# Patient Record
Sex: MALE | Race: WHITE | NOT HISPANIC OR LATINO | Employment: UNEMPLOYED | URBAN - METROPOLITAN AREA
[De-identification: names, ages, dates, MRNs, and addresses within clinical notes are randomized per-mention and may not be internally consistent; named-entity substitution may affect disease eponyms.]

---

## 2017-06-19 ENCOUNTER — OFFICE VISIT - HEALTHEAST (OUTPATIENT)
Dept: INTERNAL MEDICINE | Facility: CLINIC | Age: 47
End: 2017-06-19

## 2017-06-19 DIAGNOSIS — M13.0 POLYARTHROPATHY OR POLYARTHRITIS, HAND: ICD-10-CM

## 2017-06-19 DIAGNOSIS — H10.10 ALLERGIC CONJUNCTIVITIS: ICD-10-CM

## 2017-10-10 ENCOUNTER — OFFICE VISIT - HEALTHEAST (OUTPATIENT)
Dept: RHEUMATOLOGY | Facility: CLINIC | Age: 47
End: 2017-10-10

## 2017-10-10 DIAGNOSIS — M25.50 ARTHRALGIA: ICD-10-CM

## 2017-10-10 DIAGNOSIS — M13.0 POLYARTHROPATHY OR POLYARTHRITIS, HAND: ICD-10-CM

## 2017-10-10 ASSESSMENT — MIFFLIN-ST. JEOR: SCORE: 1734.78

## 2017-10-13 ENCOUNTER — OFFICE VISIT - HEALTHEAST (OUTPATIENT)
Dept: ALLERGY | Facility: CLINIC | Age: 47
End: 2017-10-13

## 2017-10-13 DIAGNOSIS — T78.1XXA ADVERSE FOOD REACTION, INITIAL ENCOUNTER: ICD-10-CM

## 2017-10-13 DIAGNOSIS — J30.9 ALLERGIC RHINITIS: ICD-10-CM

## 2017-10-13 DIAGNOSIS — M13.0 POLYARTHROPATHY OR POLYARTHRITIS, HAND: ICD-10-CM

## 2017-10-13 DIAGNOSIS — R10.9 ABDOMINAL PAIN: ICD-10-CM

## 2017-10-13 ASSESSMENT — MIFFLIN-ST. JEOR: SCORE: 1730.24

## 2017-10-16 ENCOUNTER — COMMUNICATION - HEALTHEAST (OUTPATIENT)
Dept: ALLERGY | Facility: CLINIC | Age: 47
End: 2017-10-16

## 2017-10-16 LAB
GLIADIN IGA SER-ACNC: 1.2 U/ML
GLIADIN IGG SER-ACNC: 0.6 U/ML
IGA SERPL-MCNC: 261 MG/DL (ref 65–400)
TTG IGA SER-ACNC: 0.4 U/ML
TTG IGG SER-ACNC: <0.6 U/ML

## 2017-12-11 ENCOUNTER — OFFICE VISIT - HEALTHEAST (OUTPATIENT)
Dept: INTERNAL MEDICINE | Facility: CLINIC | Age: 47
End: 2017-12-11

## 2017-12-11 DIAGNOSIS — Z00.00 ROUTINE GENERAL MEDICAL EXAMINATION AT A HEALTH CARE FACILITY: ICD-10-CM

## 2017-12-11 RX ORDER — AZITHROMYCIN 250 MG/1
TABLET, FILM COATED ORAL
Qty: 6 TABLET | Refills: 0 | Status: SHIPPED | OUTPATIENT
Start: 2017-12-11

## 2017-12-11 RX ORDER — METRONIDAZOLE 7.5 MG/G
GEL TOPICAL
Qty: 45 G | Refills: 3 | Status: SHIPPED | OUTPATIENT
Start: 2017-12-11

## 2017-12-11 RX ORDER — ALBUTEROL SULFATE 90 UG/1
1 AEROSOL, METERED RESPIRATORY (INHALATION) EVERY 4 HOURS PRN
Qty: 2 INHALER | Refills: 0 | Status: SHIPPED | OUTPATIENT
Start: 2017-12-11

## 2017-12-11 ASSESSMENT — MIFFLIN-ST. JEOR: SCORE: 1726.28

## 2018-02-23 ENCOUNTER — COMMUNICATION - HEALTHEAST (OUTPATIENT)
Dept: INTERNAL MEDICINE | Facility: CLINIC | Age: 48
End: 2018-02-23

## 2018-03-05 ENCOUNTER — COMMUNICATION - HEALTHEAST (OUTPATIENT)
Dept: INTERNAL MEDICINE | Facility: CLINIC | Age: 48
End: 2018-03-05

## 2018-04-02 ENCOUNTER — COMMUNICATION - HEALTHEAST (OUTPATIENT)
Dept: INTERNAL MEDICINE | Facility: CLINIC | Age: 48
End: 2018-04-02

## 2018-11-06 ENCOUNTER — COMMUNICATION - HEALTHEAST (OUTPATIENT)
Dept: INTERNAL MEDICINE | Facility: CLINIC | Age: 48
End: 2018-11-06

## 2020-02-14 ENCOUNTER — COMMUNICATION - HEALTHEAST (OUTPATIENT)
Dept: SCHEDULING | Facility: CLINIC | Age: 50
End: 2020-02-14

## 2020-09-02 ENCOUNTER — OFFICE VISIT (OUTPATIENT)
Dept: FAMILY MEDICINE | Facility: CLINIC | Age: 50
End: 2020-09-02
Payer: COMMERCIAL

## 2020-09-02 VITALS
BODY MASS INDEX: 26.07 KG/M2 | OXYGEN SATURATION: 99 % | RESPIRATION RATE: 15 BRPM | HEIGHT: 71 IN | SYSTOLIC BLOOD PRESSURE: 144 MMHG | WEIGHT: 186.25 LBS | TEMPERATURE: 97.3 F | HEART RATE: 71 BPM | DIASTOLIC BLOOD PRESSURE: 84 MMHG

## 2020-09-02 DIAGNOSIS — R94.6 THYROID FUNCTION TEST ABNORMAL: ICD-10-CM

## 2020-09-02 DIAGNOSIS — R03.0 ELEVATED BLOOD PRESSURE READING WITHOUT DIAGNOSIS OF HYPERTENSION: ICD-10-CM

## 2020-09-02 DIAGNOSIS — D22.9 MULTIPLE ATYPICAL SKIN MOLES: ICD-10-CM

## 2020-09-02 DIAGNOSIS — Z13.220 SCREENING FOR LIPID DISORDERS: ICD-10-CM

## 2020-09-02 DIAGNOSIS — Z00.00 HEALTHCARE MAINTENANCE: Primary | ICD-10-CM

## 2020-09-02 DIAGNOSIS — Z80.0 FAMILY HISTORY OF COLON CANCER: ICD-10-CM

## 2020-09-02 LAB
ALBUMIN SERPL-MCNC: 4.1 G/DL (ref 3.3–4.6)
ALP SERPL-CCNC: 77 U/L (ref 40–150)
ALT SERPL-CCNC: 35 U/L (ref 0–70)
AST SERPL-CCNC: 40 U/L (ref 0–55)
BILIRUB SERPL-MCNC: 0.8 MG/DL (ref 0.2–1.3)
BUN SERPL-MCNC: 14 MG/DL (ref 5–24)
CALCIUM SERPL-MCNC: 9.7 MG/DL (ref 8.5–10.4)
CHLORIDE SERPLBLD-SCNC: 103 MMOL/L (ref 94–109)
CHOLEST SERPL-MCNC: 168 MG/DL (ref 0–200)
CHOLEST/HDLC SERPL: 2.9 {RATIO} (ref 0–5)
CO2 SERPL-SCNC: 25 MMOL/L (ref 20–32)
CREAT SERPL-MCNC: 1 MG/DL (ref 0.8–1.5)
EGFR CALCULATED (BLACK REFERENCE): 102.6
EGFR CALCULATED (NON BLACK REFERENCE): 84.8
FASTING SPECIMEN: YES
GLUCOSE SERPL-MCNC: 85 MG/DL (ref 60–99)
HDLC SERPL-MCNC: 57 MG/DL
LDLC SERPL CALC-MCNC: 94 MG/DL (ref 0–129)
POTASSIUM SERPL-SCNC: 3.9 MMOL/L (ref 3.4–5.3)
PROT SERPL-MCNC: 6.9 G/DL (ref 6.8–8.8)
SODIUM SERPL-SCNC: 141 MMOL/L (ref 137.3–146.3)
TRIGL SERPL-MCNC: 85 MG/DL (ref 0–150)
TSH SERPL DL<=0.005 MIU/L-ACNC: 3.8 MU/L (ref 0.4–4)
VLDL-CHOLESTEROL: 17 (ref 7–32)

## 2020-09-02 SDOH — HEALTH STABILITY: MENTAL HEALTH: HOW OFTEN DO YOU HAVE A DRINK CONTAINING ALCOHOL?: 4 OR MORE TIMES A WEEK

## 2020-09-02 SDOH — HEALTH STABILITY: MENTAL HEALTH: HOW OFTEN DO YOU HAVE 6 OR MORE DRINKS ON ONE OCCASION?: NEVER

## 2020-09-02 SDOH — HEALTH STABILITY: MENTAL HEALTH: HOW MANY STANDARD DRINKS CONTAINING ALCOHOL DO YOU HAVE ON A TYPICAL DAY?: 1 OR 2

## 2020-09-02 ASSESSMENT — MIFFLIN-ST. JEOR: SCORE: 1731.07

## 2020-09-02 NOTE — PATIENT INSTRUCTIONS
"ASSESSMENT/PLAN:    Jerome is a generally healthy 51 yo who presents for his first visit to our clinic.   Some issues as follows:    1. Healthcare maintenance  Recommended small amount of weight loss given the borderline elevated blood pressure. States that he feels confident that he can get to around 175 lbs. Suggest more protein and greens in the morning. Also, may want to limit number of hours eating.     2. Elevated blood pressure reading without diagnosis of hypertension  -Look into the \"DASH\" diet.   -Minimize salt.  -Take Magnesium 325 mg at bedtime  - If pressures stay elevated return to clinic for follow up.   - Comprehensive Metabolic Panel ([place])    3. Screening for lipid disorders    - Lipid Panel ([place])    4. Multiple atypical skin moles, none look too suspcious, but will refer to dermatology  - DERMATOLOGY ADULT REFERRAL; Future    5. Family history of colon cancer    - GASTROENTEROLOGY ADULT REF PROCEDURE ONLY; Future    6. Thyroid function test abnormal    - TSH with free T4 reflex     7. Misc:  Suggested running shoe alterations given corns on side of RIGHT foot.   Also, hamstring strengthening with physioball.      --Shane Toledo MD  [facility]  Department of Family Medicine and Community Health   "

## 2020-09-02 NOTE — LETTER
September 3, 2020      Jerome Mcgraw  [address]  [address]  [facility] [address]        Jerome,    Here are your labs.   Normal/Healthy cholesterol levels and Comprehensive panel.     The thyroid test also returned in the normal range. It is on the higher side of normal so may want to recheck it in another year or more.     --Shane Toledo MD     Resulted Orders   Lipid Panel ([place])   Result Value Ref Range    FASTING SPECIMEN yes     Cholesterol 168.0 0.0 - 200.0    HDL Cholesterol 57.0 >40.0    Triglycerides 85.0 0.0 - 150.0    Cholesterol/HDL Ratio 2.9 0.0 - 5.0    LDL Cholesterol Direct 94.0 0.0 - 129.0    VLDL-Cholesterol 17.0 7.0 - 32.0   TSH with free T4 reflex   Result Value Ref Range    TSH 3.80 0.40 - 4.00 mU/L   Comprehensive Metabolic Panel ([place])   Result Value Ref Range    Glucose 85.0 60.0 - 99.0 mg/dL    Urea Nitrogen 14.0 5.0 - 24.0 mg/dL    Calcium 9.7 8.5 - 10.4 mg/dL    Creatinine 1.0 0.8 - 1.5 mg/dL    eGFR Calculated (Non Black Reference) 84.8 >60.0    eGFR Calculated (Black Reference) 102.6 >60.0    Sodium 141.0 137.3 - 146.3 mmol/L    Potassium 3.9 3.4 - 5.3 mmol/L    Chloride 103.0 94.0 - 109.0 mmol/L    Carbon Dioxide 25.0 20.0 - 32.0 mmol/L    Albumin 4.1 3.3 - 4.6 g/dL    Alkaline Phosphatase 77.0 40.0 - 150.0 U/L    ALT 35.0 0.0 - 70.0 U/L    AST 40.0 0.0 - 55.0 U/L    Bilirubin Total 0.8 0.2 - 1.3 mg/dL    Protein Total 6.9 6.8 - 8.8 g/dL

## 2020-09-02 NOTE — NURSING NOTE
"50 year old  Chief Complaint   Patient presents with     Physical     with labs, family history of cancer, talk about testing     Hypertension     blood pressure has been higher recently     Derm Problem     moles wants checked, also possible athlete's foot on right foot       Blood pressure (!) 152/99, pulse 72, temperature 97.3  F (36.3  C), temperature source Skin, resp. rate 15, height 1.794 m (5' 10.63\"), weight 84.5 kg (186 lb 4 oz), SpO2 99 %. Body mass index is 26.25 kg/m .  There is no problem list on file for this patient.      Wt Readings from Last 2 Encounters:   09/02/20 84.5 kg (186 lb 4 oz)     BP Readings from Last 3 Encounters:   09/02/20 (!) 152/99         No current outpatient medications on file.     No current facility-administered medications for this visit.        Social History     Tobacco Use     Smoking status: Never Smoker     Smokeless tobacco: Never Used   Substance Use Topics     Alcohol use: Yes     Frequency: 4 or more times a week     Drinks per session: 1 or 2     Binge frequency: Never     Drug use: Never       Health Maintenance Due   Topic Date Due     PREVENTIVE CARE VISIT  1972     HIV SCREENING  03/17/1987     DTAP/TDAP/TD IMMUNIZATION (1 - Tdap) 03/17/1997     LIPID  03/17/2007     PHQ-2  01/01/2020     INFLUENZA VACCINE (1) 09/01/2020       No results found for: PAP      September 2, 2020 1:44 PM    "

## 2020-09-02 NOTE — PROGRESS NOTES
Jerome Mcgraw is a 50 year old male who presents today for his first visit to the [facility].     Recently moved back to [facility] from [facility]. Family was there as wife works for [employer].     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      with a son, james Zhao 2008.     Wife works for [employer].      Main issues today are to talk about cancer screening.   Also, has been monitoring blood pressure due to mild elevations when in [facility] and it's been high lately. Also, [facility] was 140/85 in July 2020. Has a home BP unit. Home readings of 117-135/75-85  Had a negative stress test in [facility].  His Thyroid was borderline in [facility].     Also, wants a mole checked and to evaluate possible Athlete's foot.     Normally weighs between 180-185 lbs.     Regarding lifestyle behaviors:  Physical activity - Active runner and cyclist. Has run a few marathons.     Diet - Healthy    He drinks alcohol approximately 4 nights/week and 1-2 drinks/night.     He does not use tobacco products.     Jerome Mcgraw wears seat belt, and when biking, a bike helmet.    His last dental check up was a few weeks ago.       STD concerns: none    ED or BPH: no symptoms    There is no problem list on file for this patient.      No past surgical history on file.    Family History   Problem Relation Age of Onset     Cancer Mother         bile duct      Breast Cancer Maternal Grandmother      Lung Cancer Maternal Grandfather      Colon Cancer Paternal Grandmother      Colon Cancer Maternal Cousin    * Strong family history of cancer.       No current outpatient medications on file.     Allergies   Allergen Reactions     Seasonal Allergies          Regarding preventive health care, his immunizations are as follows:    Immunization History   Administered Date(s) Administered     FLU 6-35 months 11/01/2011, 11/19/2012     Influenza (IIV3) PF 11/10/2016, 12/11/2017     Pneumococcal  "23 valent 11/19/2012     Td (Adult), Adsorbed 03/01/2005     Tdap (Adult) Unspecified Formulation 08/01/2011       Screening for colorectal cancer - None yet, but strong family history of cancers.         ROS  PHQ-2 Score:     PHQ-2 ( 1999 Pfizer) 9/2/2020   Q1: Little interest or pleasure in doing things 0   Q2: Feeling down, depressed or hopeless 0   PHQ-2 Score 0       CONSTITUTIONAL:NEGATIVE for fever, chills, change in weight  EYES: NEGATIVE for vision changes or irritation  ENT/MOUTH: NEGATIVE for ear, mouth and throat problems  RESP:NEGATIVE for significant cough or SOB  CV: NEGATIVE for chest pain, palpitations, ESTEVEZ, orthopnea, PND  or peripheral edema  GI: NEGATIVE for nausea, abdominal pain, heartburn, or change in bowel habits  :NEGATIVE for frequency, dysuria, or hematuria  MUSCULOSKELETAL:NEGATIVE for significant arthralgias or myalgia  NEURO: NEGATIVE for weakness, dizziness or paresthesias  ENDOCRINE: NEGATIVE for polyuria/dipsia,  temperature intolerance, skin/hair changes  HEME/ALLERGY/IMMUNE: NEGATIVE for bleeding problems  PSYCHIATRIC: NEGATIVE for changes in mood or affect    EXAM  BP (!) 152/99   Pulse 72   Temp 97.3  F (36.3  C) (Skin)   Resp 15   Ht 1.794 m (5' 10.63\")   Wt 84.5 kg (186 lb 4 oz)   SpO2 99%   BMI 26.25 kg/m      Appears as a healthy, physically fit 50-year-old in no distress.  H EENT was normal.  Neck was supple without lymphadenopathy.  Cardiovascular was regular rate and rhythm without murmurs    Lungs were clear to auscultation  Abdomen was soft and nontender without hepatosplenomegaly    Musculoskeletal exam was overall normal although he has limited hip flexion on the right side greater than the left side with also limited internal range of motion of his hips.    Normal knee exam.    Right foot has some calluses forming over the fibular aspect of the fifth metatarsal phalangeal joint and some of the surrounding areas.    Skin with multiple moles across his chest " "and back.  There is one small scaly mole on his left anterior chest wall that he is concerned about.  It is small and scaly.    His mentation and affect were normal.      ASSESSMENT/PLAN:    Jerome is a generally healthy 49 yo who presents for his first visit to our clinic.   Some issues as follows:    1. Healthcare maintenance  Recommended small amount of weight loss given the borderline elevated blood pressure. States that he feels confident that he can get to around 175 lbs. Suggest more protein and greens in the morning. Also, may want to limit number of hours eating.     2. Elevated blood pressure reading without diagnosis of hypertension  -Look into the \"DASH\" diet.   -Minimize salt.  -Take Magnesium 325 mg at bedtime  - If pressures stay elevated return to clinic for follow up.   - Comprehensive Metabolic Panel ([place])    3. Screening for lipid disorders    - Lipid Panel ([place])    4. Multiple atypical skin moles, none look too suspcious, but will refer to dermatology  - DERMATOLOGY ADULT REFERRAL; Future    5. Family history of colon cancer    - GASTROENTEROLOGY ADULT REF PROCEDURE ONLY; Future    6. Thyroid function test abnormal    - TSH with free T4 reflex     7. Misc:  Suggested running shoe alterations given corns on side of RIGHT foot.   Also, hamstring strengthening with physioball.      --Shane Toledo MD  [facility]  Department of Family Medicine and Community Health           "

## 2021-01-04 ENCOUNTER — HEALTH MAINTENANCE LETTER (OUTPATIENT)
Age: 51
End: 2021-01-04

## 2021-05-31 VITALS — HEIGHT: 71 IN | WEIGHT: 185 LBS | BODY MASS INDEX: 25.9 KG/M2

## 2021-05-31 VITALS — HEIGHT: 71 IN | BODY MASS INDEX: 26.04 KG/M2 | WEIGHT: 186 LBS

## 2021-06-06 NOTE — TELEPHONE ENCOUNTER
Patient calling. Has no symptoms of the flu, just wants to know if he should have TAMIFLU Medication.  He states his son has the flu, unsure if it is INFLUENZA .    Patient has no underlying illnesses.or chronic conditions.  Told to call back if it is confirmed today that he was exposed to INFLUENZA A OR B .    Patient agrees to POC.    Malia Palm RN  [facility] Triage/refill nurse

## 2021-06-11 NOTE — PROGRESS NOTES
Clinic Note    Assessment:     1. Polyarthritis Of The Hand  Ambulatory referral to Rheumatology   2. Allergic conjunctivitis  Ambulatory referral to Allergy     Assessment and Plan:  He is here about the conjunctivitis which seems allergic in nature and for which I prescribed azelastine drops if he wants to switch from his Zyrtec drops over-the-counter. Nothing concerning for conjunctivitis that needs abx tx.    For his persistent arthropathy in his right index finger in the PIP, I would suggest that he see Dr. Benjamin again for repeat evaluation of this inflammatory arthropathy.  His comment is that his left DIP is starting to be a bit sore.  It did not look like an inflammatory process at this point to me.     Patient Instructions   Azelastine one drop each eye twice per day    Allergist    Rheumatologist    Return if symptoms worsen or fail to improve.         Subjective:      Jerome Mcgraw is a 47 y.o. male comes in complaining of few days of itchy watery eyes.  He has significant allergies and allergic rhinitis and has had some allergic eye symptoms before.  Just started using Zyrtec eyedrop last night.  There was no significant pus.  He wanted to make sure there was no infection.  He would like to see an allergist about his allergies.  He had questions or concerns about whether or not his allergies are causing inflammation in the joints and were tied to the arthropathy.  I do not believe so.  He is gotten multiple injections gets them every 3-6 months through a orthopedist who Dr. GRANADO had suggested the patient seen for another opinion.  He has not been back to see Dr. GRANADO for some time.    The following portions of the patient's history were reviewed and updated as appropriate: Past medical history, allergies, medications,    Review of Systems:    Completely negative except as above  History   Smoking Status     Former Smoker   Smokeless Tobacco     Not on file         Objective:     Vitals:    06/19/17 1012    BP: 112/64   Pulse: 62       Exam:  Both eyes conjunctive in the sclera conjunctive are somewhat erythematous but the left worse than right.  There is no pus.  There is no foreign body sensation.    Right PIP is swollen left DIP does not appear swollen  Psych-normal  ENT sounds a little congested      Patient Active Problem List   Diagnosis     Asthma     Polyarthritis Of The Hand     Arthralgia     Current Outpatient Prescriptions on File Prior to Visit   Medication Sig Dispense Refill     [DISCONTINUED] albuterol (PROAIR HFA) 90 mcg/actuation inhaler Inhale 1 puff 2 (two) times a day. 1 Inhaler 3     No current facility-administered medications on file prior to visit.          Dima Patino    6/19/2017

## 2021-06-13 NOTE — PROGRESS NOTES
ASSESSMENT AND PLAN:  Jerome Mcgraw 47 y.o. male   here for follow-up of inflammatory arthropathy/Tinosynovitis which is troubled him for years and his right index finger.  There is been little evidence to suggest I defining correct her such as associated psoriasis.  He has been going to the hand surgeon for periodic corticosteroid injections.  He is also noted a new phenomenon such as the left index finger DIP and the fifth finger DIP areas becoming more prominent and painful these are likely Heberden nodes.  I have given him literature on psoriatic arthritis, osteoarthritis review management principles were reviewed.      Diagnoses and all orders for this visit:    Polyarthritis Of The Hand    Arthralgia        HISTORY OF PRESENTING ILLNESS:  Jerome Mcgraw 47 y.o. is here for  is here for follow-up after an extended hiatus last seen here in January 2016.  He has noticed new issues, he is noted left index finger and fifth digits of the left side DIP area tenderness and sometimes difficulty making it flexed.  This is something that is different from what his experience with the past several years which is the right index finger swelling tenderness which is features of tenosynovitis.  He has had periodic injections every 3-5 months at orthopedics for this.  These provide him with that relief.  Now he wonders about synovitic to me as was discussed in the past.  He does not have psoriasis himself or the family.  He has not had iritis.  He has not involvement of other similar joints in digits or toes.  , We reviewed the data so far. He noted that there is no limitation of his day-to-day activity. Further historical information, including ROS and limitation in activities as noted in the multidimensional health assessment questionnaire scanned in the EMR and in the assessment and plan section.  The following is a note from the infectious disease home he saw on 10/9/15.  Assessment:   Intermittent right index finger  "PIP swelling and tenderness and redness in a patient with exposure to tick bites, positive lyme serology from 2012 and 2013. Never treated as lyme disease. Much better ROM with 28 days of Doxy. Unclear if this is because of steroid injection or the antibiotic. I suspect because of the antibiotic. Based on previous frequency of recurrence should be have more flare soon if diagnosis of lyme arthritis is incorrect.   Recommendations:   -Let's complete the few days of antibiotic left.   -If recurrence of flare, please give me a call and we will repeat the course of Doxy 1 more time.   -If recurrence let's not do the injection in order to be able to assess response to antibiotic alone.   -If no recurrence then we most likely have lyme arthritis as diagnosis.   Follow up as needed.   HUYEN chand tot. [facility]   [facility]   [phone] Option-2    ALLERGIES:Review of patient's allergies indicates no known allergies.    PAST MEDICAL/ACTIVE PROBLEMS/MEDICATION/SOCIAL DATA  No past medical history on file.  History   Smoking Status     Former Smoker   Smokeless Tobacco     Not on file     Patient Active Problem List   Diagnosis     Asthma     Polyarthritis Of The Hand     Arthralgia     Current Outpatient Prescriptions   Medication Sig Dispense Refill     oxymetazoline (RHOFADE) 1 % Crea Apply topically daily.       No current facility-administered medications for this visit.        DETAILED EXAMINATION  10/10/17  :  Vitals:    10/10/17 0823   BP: 138/80   Pulse: (!) 55   Weight: 186 lb (84.4 kg)   Height: 5' 11.25\" (1.81 m)     Alert oriented. Head including the face is examined for malar rash, heliotropes, scarring, lupus pernio. Eyes examined for redness such as in episcleritis/scleritis, periorbital lesions.   Neck examined  for lymph nodes, range of motion Both upper and lower extremities (all four) examined for swollen, warm &/or  tender joints, range of " motion, rash, muscle weakness, edema. The salient normal / abnormal findings are appended.  Heberden nodes left index and left fifth digit.  Modest swelling of the right index finger proximal half.  This is minimally tender.  There is no onycholysis, dactylitis, nail pitting. There is no synovitis in the MCPs, wrists, elbows, and knees.      LAB / IMAGING DATA:  ALT   Date Value Ref Range Status   11/10/2016 26 0 - 45 U/L Final   08/12/2015 22 0 - 45 U/L Final   02/12/2014 35 12 - 78 U/L Final     Comment:     New ALT test method with new reference range as of 6/4/12     Albumin   Date Value Ref Range Status   11/10/2016 4.0 3.5 - 5.0 g/dL Final   08/12/2015 3.8 3.5 - 5.0 g/dL Final   02/12/2014 4.1 3.4 - 5.0 g/dL Final     Creatinine   Date Value Ref Range Status   11/10/2016 0.74 0.70 - 1.30 mg/dL Final   08/12/2015 0.80 0.70 - 1.30 mg/dL Final   06/06/2013 0.9 0.6 - 1.3 mg/dL Final       WBC   Date Value Ref Range Status   11/10/2016 5.2 4.0 - 11.0 thou/uL Final   08/12/2015 5.3 4.0 - 11.0 thou/uL Final     Hemoglobin   Date Value Ref Range Status   11/10/2016 14.7 14.0 - 18.0 g/dL Final   08/12/2015 14.7 14.0 - 18.0 g/dL Final   05/16/2013 13.8 (L) 14.0 - 18.0 g/dL Final     Platelets   Date Value Ref Range Status   11/10/2016 247 140 - 440 thou/uL Final   08/12/2015 229 140 - 440 thou/uL Final   05/16/2013 195 140 - 440 thou/uL Final       Lab Results   Component Value Date    RF <15.0 08/12/2015    SEDRATE 2 08/12/2015

## 2021-06-13 NOTE — PROGRESS NOTES
Chief complaint: Food allergy    History of present illness: This is a pleasant 47-year-old gentleman who is here today for evaluation of food allergy.  He states that he has developed polyarthritis of the hands.  He states specifically one finger has been giving him trouble.  He has been seen by rheumatology and is receiving cortisone injections into his finger.  He states he is now getting arthritis in some of his other digits.  He wonders if he could have inflammation secondary to food.  He is not suspicious of one's particular food.  He had a friend who underwent what sounds like E 95 her IgG food testing.  He is wondering if this may be an option for him.  He does complain of some abdominal pain and notes that his dad has colitis so he is concerned about celiac disease.  He states he does have some abnormal bowel habits at times.  He denies any hives, swelling or shortness of breath when he eats foods.    He does have a history of environmental allergies and states that he underwent environmental allergy testing years ago and was positive to multiple pollens and molds.  He states he has symptoms year round so he wonders if there is other things contributing to his symptoms.  He does not use any allergy medication regularly.  He has not tried nasal rinses regularly.  He has not use nasal sprays.  No history of asthma.  He states he has a lot of nasal congestion.  He has drainage.  He denies any itchy watery eyes.  Sense of smell is intact.    Past medical history: Exercise-induced bronchospasm, nasal septum repair    Social history: He lives in a home with central air and a basement, no visible mold, non-smoker    Family history: Dad with colitis    Review of Systems performed as above and the remainder is negative.  He states that he has a rash on both arms that he has been told is secondary to broken blood vessels      Current Outpatient Prescriptions:      oxymetazoline (RHOFADE) 1 % Crea, Apply topically  "daily., Disp: , Rfl:     No Known Allergies    /82  Pulse 72  Ht 5' 11.25\" (1.81 m)  Wt 185 lb (83.9 kg)  BMI 25.62 kg/m2  Gen: Pleasant male not in acute distress  HEENT: Eyes no erythema of the bulbar or palpebral conjunctiva, no edema. Ears: TMs well visualized, no effusions. Nose: Inferior turbinates are congested.  Clear mucus discharge in both nasal passages mouth: Throat clear drainage, no lip or tongue edema.     Skin: Small petechiae of both arms bilaterally  Psych: Alert and oriented times 3    Impression report and plan:    1.  Adverse food reaction  2.  Arthritis  3.  Abdominal pain  4.  Allergic rhinitis    Symptoms are not consistent with IgE mediated food allergy.  For this reason he is not a candidate for food allergy testing.  I did state that he 89 testing her IgG testing is not evidence based.  For this reason it is difficult to interpret.  There is no evidence based medicine tests for inflammation secondary to food or food sensitivities or intolerances except for gluten.  We can do a celiac disease test today given that he does have some abdominal pain at times and is concerned about this.  I gave him information regarding the FODMAP diet but stated this is often more helpful for focus to have predominantly gastrointestinal symptoms.  For his allergic rhinitis, I recommended the use of nasal rinses regularly, I went over environmental control.  I suggested a daily antihistamine plus fluticasone nasal spray.  Follow as needed.    Time spent with the patient, 30 minutes, greater than half spent counseling and coordination of care regarding allergy.          "

## 2021-06-14 NOTE — PROGRESS NOTES
ASSESSMENT:  1. Routine general medical examination at a health care facility  All up-to-date.    PLAN:  Patient Instructions   If you develop a fever or your ears begin to hurt/your symptoms worsen, fill the Zithromax.      Orders Placed This Encounter   Procedures     Influenza, Seasonal,Quad Inj, 36+ MOS     Medications Discontinued During This Encounter   Medication Reason     oxymetazoline (RHOFADE) 1 % Crea Therapy completed     albuterol (PROAIR HFA) 90 mcg/actuation inhaler Therapy completed     metroNIDAZOLE (METROGEL) 0.75 % gel Reorder       Return in about 1 year (around 12/11/2018) for Annual physical.    ASSESSED PROBLEMS:  Problem List Items Addressed This Visit     None      Visit Diagnoses     Routine general medical examination at a health care facility    -  Primary          CHIEF COMPLAINT:  Chief Complaint   Patient presents with     physical exam       HISTORY OF PRESENT ILLNESS:  Jerome Mcgraw is a 47 y.o. male presenting to the clinic today for an annual physical exam.    Right Knee Injury: He injured his right knee playing hockey in the first week of November. He had an XR completed and it did not show any fracture or dislocation. There was some swelling after the injury but it did not persist.     HM: He has a family history of colon cancer but has not had a colonoscopy completed yet. He received the influenza vaccine today otherwise all of his immunizations are up to date at this time.       REVIEW OF SYSTEMS:   He had an MRI of his right hand completed the other week and had surgery to remove a floating piece of calcium around the PIP joint of his right index finger. He has had a nasty cold the last week including a sore throat. His energy was normal.   See completed form B. Comprehensive review of systems negative except as noted above.    PFSH:  Social: He will be moving to [facility] at the end of this month with his wife. He will be back in the summer for a couple of weeks  "at a time. It is only a two year assignment.   Surgical: He had surgery on his right index finger.     History reviewed. No pertinent past medical history.  Past Surgical History:   Procedure Laterality Date     FINGER SURGERY Right 12/2017    R index finger, removal of calcium deposit     NASAL SEPTUM SURGERY       Family History   Problem Relation Age of Onset     Breast cancer Mother      Cancer Mother      Bile Duct     Cancer Father      Bladder     Ulcerative colitis Father      Colon cancer Maternal Grandmother      Breast cancer Paternal Grandmother      Colon cancer Cousin      Social History     Social History     Marital status:      Spouse name: N/A     Number of children: N/A     Years of education: N/A     Occupational History     Not on file.     Social History Main Topics     Smoking status: Former Smoker     Smokeless tobacco: Not on file     Alcohol use Yes      Comment: occasional     Drug use: Not on file     Sexual activity: Not on file     Other Topics Concern     Not on file     Social History Narrative       VITALS:  Vitals:    12/11/17 1301   BP: 128/64   Pulse: 72   Weight: 185 lb (83.9 kg)   Height: 5' 11\" (1.803 m)     Wt Readings from Last 3 Encounters:   12/11/17 185 lb (83.9 kg)   10/13/17 185 lb (83.9 kg)   10/10/17 186 lb (84.4 kg)     Body mass index is 25.8 kg/(m^2).    PHYSICAL EXAM:  General Appearance: Alert, cooperative, no distress, appears stated age.  Pulse rechecked by MD: mid 50's.   HEENT: EMOI, fundi not observed, TMs slightly erythematous, mouth and throat without lesions.  Neck: Supple without adenopathy or thyromegaly.  Back: No CVA tenderness or spinous process pain.  Lungs: Clear to auscultation bilaterally, good air movement.  Heart: Regular rate and rhythm, S1 and S2 normal, no murmur or bruit.  Abdomen: Soft, non-tender, no HSM or masses.   Genitourinary: Normal male, testes descended without masses or hernias.  Rectal exam:  Normal size for age without " nodules. Prostate relatively flat.   Breast: Normal. Taught to do breast exam.   Musculoskeletal: A little laxity of LCL of right knee, otherwise normal. No other gross abnormalities.   Extremities: No CCE, pulses II/IV and symmetric,   Skin: No worrisome lesions noted.  Lymph nodes: Cervical, supraclavicular, groin, and axillary nodes normal.  Neurologic: CNII-XII intact, strength V/V and symmetric, DTRs II/IV and symmetric, sensory grossly intact  Psychiatric:  He has a normal mood and affect.     Notes Reviewed, additional history from source other than patient (2 TOTAL): None.    Accessed Care Everywhere, Requested Records, Consult with Physician (1 TOTAL): None.     Radiology tests summarized or ordered (XR, CT, MRI, DXA, US) (1 TOTAL): None.    Labs reviewed or ordered (1 TOTAL): Reviewed labs from 11/10/16; lipid, UA, HM2.     Medicine tests reviewed or ordered (ECG, echocardiogram, colonoscopy, EGD, venous US) (1 TOTAL): None.    Independent review of ECG or XR (2 EACH): None.    The visit lasted a total of 20 minutes face to face with the patient. Over 50% of the time was spent counseling and educating the patient about health maintenance.    ILena, am scribing for and in the presence of, Dr. Patino.    I, Dr. Us, personally performed the services described in this documentation, as scribed by Lena England in my presence, and it is both accurate and complete.    MEDICATIONS:  Current Outpatient Prescriptions   Medication Sig Dispense Refill     metroNIDAZOLE (METROGEL) 0.75 % gel Use a small amount daily on your nose 45 g 3     albuterol (PROAIR HFA) 90 mcg/actuation inhaler Inhale 1 puff every 4 (four) hours as needed for wheezing. 2 Inhaler 0     azithromycin (ZITHROMAX Z-OSEI) 250 MG tablet Take two tablets on day one, then one tablet daily for 4 days. 6 tablet 0     No current facility-administered medications for this visit.        Total data points: 1

## 2021-07-19 ENCOUNTER — OFFICE VISIT (OUTPATIENT)
Dept: FAMILY MEDICINE | Facility: CLINIC | Age: 51
End: 2021-07-19
Payer: COMMERCIAL

## 2021-07-19 VITALS
TEMPERATURE: 97.2 F | WEIGHT: 194.8 LBS | HEART RATE: 67 BPM | RESPIRATION RATE: 14 BRPM | HEIGHT: 71 IN | OXYGEN SATURATION: 99 % | BODY MASS INDEX: 27.27 KG/M2 | DIASTOLIC BLOOD PRESSURE: 96 MMHG | SYSTOLIC BLOOD PRESSURE: 137 MMHG
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
## 2021-07-19 DIAGNOSIS — R03.0 ELEVATED BLOOD PRESSURE READING WITHOUT DIAGNOSIS OF HYPERTENSION: ICD-10-CM

## 2021-07-19 LAB
B BURGDOR IGG+IGM SER QL: 0.15
TSH SERPL DL<=0.005 MIU/L-ACNC: 3.76 MU/L (ref 0.4–4)

## 2021-07-19 PROCEDURE — 86618 LYME DISEASE ANTIBODY: CPT | Performed by: FAMILY MEDICINE

## 2021-07-19 PROCEDURE — 84443 ASSAY THYROID STIM HORMONE: CPT | Performed by: FAMILY MEDICINE

## 2021-07-19 ASSESSMENT — MIFFLIN-ST. JEOR: SCORE: 1764.86

## 2021-07-19 NOTE — NURSING NOTE
"51 year old  Chief Complaint   Patient presents with     Insect Bites     pt reports he had tick bite early July and is now having inflamation in his left knee       Blood pressure (!) 159/89, pulse 82, temperature 97.2  F (36.2  C), resp. rate 14, height 1.794 m (5' 10.63\"), weight 88.4 kg (194 lb 12.8 oz), SpO2 99 %. Body mass index is 27.45 kg/m .  Patient Active Problem List   Diagnosis     Asthma     Arthralgia       Wt Readings from Last 2 Encounters:   07/19/21 88.4 kg (194 lb 12.8 oz)   09/02/20 84.5 kg (186 lb 4 oz)     BP Readings from Last 3 Encounters:   07/19/21 (!) 159/89   09/02/20 (!) 144/84         Current Outpatient Medications   Medication     albuterol (PROAIR HFA) 90 mcg/actuation inhaler     azithromycin (ZITHROMAX Z-OSEI) 250 MG tablet     metroNIDAZOLE (METROGEL) 0.75 % gel     No current facility-administered medications for this visit.       Social History     Tobacco Use     Smoking status: Never Smoker     Smokeless tobacco: Never Used   Substance Use Topics     Alcohol use: Yes     Drug use: Never       Health Maintenance Due   Topic Date Due     ASTHMA ACTION PLAN  Never done     ADVANCE CARE PLANNING  Never done     ASTHMA CONTROL TEST  10/02/2018     PHQ-2  01/01/2021       No results found for: PAP      July 19, 2021 8:15 AM  "

## 2021-07-19 NOTE — PATIENT INSTRUCTIONS
ASSESSMENT/PLAN:    Jerome is a 50 yo who presents after a Tick bite. Has bilateral knee pains with known DJD. Also noted to have elevated BP again  1. Tick bite, unlikely to be Lyme's by appearance or history   - Reassured that Tick did not appear to be a deer tick. He had no rashes and other explanations for knee pain, e.g. DJD and weight gain  - Will check Lymes Ab with [employer] per protocol    2. Bilateral knee DJD  -Start PT and other recommendation of Chino Bear MD    3. Elevated BP with weight gain  - Will check TSH again as was borderline high last time  - Start Magnesium at bedtime  - Aim to lose about 10 lbs over the next 3 months. Discussed dietary strategies.   - Check and record on phone BP numbers from home  - Return to clinic in about 2-3 months for annual exam. Return sooner if needed.     --Shane Toledo MD

## 2021-07-19 NOTE — PROGRESS NOTES
"OVERVIEW: Jerome Mcgraw is a 51 year old male who presents to [facility] today for Insect Bites (pt reports he had tick bite early July and is now having inflamation in his left knee)        ASSESSMENT/PLAN:    Jerome is a 52 yo who presents after a Tick bite. Has bilateral knee pains with known DJD. Also noted to have elevated BP again  1. Tick bite, unlikely to be Lyme's by appearance or history   - Reassured that Tick did not appear to be a deer tick. He had no rashes and other explanations for knee pain, e.g. DJD and weight gain  - Will check Lymes Ab with [employer]ot per protocol    2. Bilateral knee DJD  -Start PT and other recommendation of Chino Bear MD    3. Elevated BP with weight gain  - Will check TSH again as was borderline high last time  - Start Magnesium at bedtime  - Aim to lose about 10 lbs over the next 3 months. Discussed dietary strategies.   - Check and record on phone BP numbers from home  - Return to clinic in about 2-3 months for annual exam. Return sooner if needed.     --Shane Toledo MD      SUBJECTIVE:   Jerome was at his cabin in [place], [place] and had a tick bite in early July. About 3 weeks ago. Tick was on LEFT scapular area. He pulled it off (froze it and brought it in, see photo below).     Prior to that, he'd been dealing with bilateral knee pains. He'd been having some swelling in LEFT knee. It has been off an on for a few years. Noticed some prior to the Tick bite. Furthermore, he tripped on a step and hurt his RIGHT knee. Started to notice popping sound.     Went to [facility] last week. Saw Chino Bear MD. Received an X-ray and MRI of both knees. Was told that the RIGHT knee was worse than the LEFT in terms of arthritis. There is also \"inflammation\" on the LEFT with a Bakers cyst.   Was Rx'd an anti-inflammatory, but has not yet taken.   Was also Rx'd a PT referral, but Jerome has yet to arrange.     Has been under stress recently as moving to a new home. Just bought " "a home in [place], [place].   He's been very sedentary due to new job. Reports some weight gain (by our scales, it has been 8-9 lbs in past year).     Jerome says that he's \"OK\" if on feet, but then gets stiff with sitting. And, yesterday, he was in the car driving his son to [place].     No rashes. No other joint pains.     Reports checking BP at home and getting normal numbers of about 118/70s.     Review Of Systems:  No fevers, sweats or chills.   Has otherwise been in usual state of health, e.g.   Cardiovascular: negative  Respiratory: No shortness of breath, dyspnea on exertion, cough, or hemoptysis  Gastrointestinal: negative  Genitourinary: negative    Wt Readings from Last 5 Encounters:   07/19/21 88.4 kg (194 lb 12.8 oz)   09/02/20 84.5 kg (186 lb 4 oz)   12/11/17 83.9 kg (185 lb)   10/13/17 83.9 kg (185 lb)   10/10/17 84.4 kg (186 lb)         Problem list per EMR:  Patient Active Problem List   Diagnosis     Asthma     Arthralgia       Current Outpatient Medications   Medication Sig Dispense Refill     albuterol (PROAIR HFA) 90 mcg/actuation inhaler [ALBUTEROL (PROAIR HFA) 90 MCG/ACTUATION INHALER] Inhale 1 puff every 4 (four) hours as needed for wheezing. (Patient not taking: Reported on 7/19/2021) 2 Inhaler 0     azithromycin (ZITHROMAX Z-OSEI) 250 MG tablet [AZITHROMYCIN (ZITHROMAX Z-OSEI) 250 MG TABLET] Take two tablets on day one, then one tablet daily for 4 days. 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      with a son, james Zhao 2008.     Wife, Radha worked at [employer]. She is now at a new company.     The family moved to [place], [place] " "in July 2021.          OBJECTIVE    Vitals: BP (!) 159/89   Pulse 82   Temp 97.2  F (36.2  C)   Resp 14   Ht 1.794 m (5' 10.63\")   Wt 88.4 kg (194 lb 12.8 oz)   SpO2 99%   BMI 27.45 kg/m    BMI= Body mass index is 27.45 kg/m .  BP rechecked at 146/96 and 137/96.   Jerome appears in no distress.  His skin was examined near the area of the tick bite and elsewhere and there were no rashes consistent with Lyme's.  He had no joint swellings in his hands wrists or elsewhere.  Bilateral knee exams were performed.  Slight effusion of the right knee and there does appear to be a small Baker's cyst in the left knee.  Otherwise the knee exams were normal with full range of motion given the subtle effusions.  No ligamentous laxity.  No significant joint line tenderness.  Generally normal hip range of motion.    Tick that was on his body is as shown below. Does not appear to me to be a deer tick given size and shell. Jerome was in agreement.             SEE TOP OF NOTE FOR ASSESSMENT AND PLAN    30 minutes spent on the date of the encounter doing chart review, history and exam, documentation and further activities as noted in the note.     --Shane Toledo MD  [facility], Department of Family Medicine and Community Health  "

## 2021-10-11 ENCOUNTER — HEALTH MAINTENANCE LETTER (OUTPATIENT)
Age: 51
End: 2021-10-11

## 2022-09-24 ENCOUNTER — HEALTH MAINTENANCE LETTER (OUTPATIENT)
Age: 52
End: 2022-09-24

## 2023-01-29 ENCOUNTER — HEALTH MAINTENANCE LETTER (OUTPATIENT)
Age: 53
End: 2023-01-29

## 2024-02-25 ENCOUNTER — HEALTH MAINTENANCE LETTER (OUTPATIENT)
Age: 54
End: 2024-02-25